# Patient Record
(demographics unavailable — no encounter records)

---

## 2024-10-07 NOTE — DISCUSSION/SUMMARY
[de-identified] : The underlying pathophysiology was reviewed with the patient. XR films were reviewed with the patient. Discussed at length the nature of the patient's condition.   Patient advised to use buddy tape rather than stint in order to minimize joint stiffness.   The patient should follow up as needed.

## 2024-10-07 NOTE — DISCUSSION/SUMMARY
[de-identified] : The underlying pathophysiology was reviewed with the patient. XR films were reviewed with the patient. Discussed at length the nature of the patient's condition.   Patient advised to use buddy tape rather than stint in order to minimize joint stiffness.   The patient should follow up as needed.

## 2024-10-07 NOTE — PHYSICAL EXAM
[de-identified] : Right hand (small finger):  Tenderness and edema in the right little finger proximal phalanx Full ROM with pain Sensation is normal [de-identified] : AP, lateral, and oblique views of the right hand were obtained today and revealed nondisplaced fracture of right little finger proximal phalanx

## 2024-10-07 NOTE — HISTORY OF PRESENT ILLNESS
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 He was getting out of a machine, tripped and fell with his hand outstretched. He went to urgent care several days later, had x-rays and was told he had a fracture. He was given a removable splint. He has mild pain at the PIP worse with flexion. He has tried ice with some relief. Denies numbness/tingling. Denies prior injuries. He has not worked since the injury.

## 2024-10-07 NOTE — HISTORY OF PRESENT ILLNESS
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 He was getting out of a machine, tripped and fell with his hand outstretched. He went to urgent care several days later, had x-rays and was told he had a fracture. He was given a removable splint. He has mild pain at the PIP worse with flexion. He has tried ice with some relief. Denies numbness/tingling. Denies prior injuries. He has not worked since the injury.

## 2024-10-07 NOTE — PHYSICAL EXAM
[de-identified] : Right hand (small finger):  Tenderness and edema in the right little finger proximal phalanx Full ROM with pain Sensation is normal [de-identified] : AP, lateral, and oblique views of the right hand were obtained today and revealed nondisplaced fracture of right little finger proximal phalanx

## 2024-10-07 NOTE — ADDENDUM
[FreeTextEntry1] : This note was written by Thong Marcum on 10/07/2024 actively solely MALORIE Chamorro M.D.     All medical record entries made by the Scribe were at my, MALORIE Chamorro M.D. direction and personally dictated by me on 10/07/2024. I have personally reviewed the chart and agree that the record reflects my personal performance of the history, physical exam, assessment, and plan.